# Patient Record
(demographics unavailable — no encounter records)

---

## 2025-04-29 NOTE — HISTORY OF PRESENT ILLNESS
[FreeTextEntry1] : 53 f/u CAD   former smoker, w/ PMH of DM, B/L LE cellulitis 2-3 yrs ago who was referred to [facility] ED 3/9/24 from  with CC of CP X 1 day. Pt. reports around 8 pm. he experienced burning chest pain lasting 20-30 min which resolved after drinking a glass of water. After 10 min, he reports having sharp b/l chest pain, nonradiating, rates it as 8/10 lasting for 1-2 hrs prompting to go to . He denies any SOB. diaphoresis prior CP, dizziness, N/V, LE edema, PND/orthopnea and syncopeIn ED, /76 HR 88 RR 17 T 36.7 02 100 RA, Labs revealed HsTropT 65, CXR: normal. EKG: NSR @ 76, no ST -T changes noted. In ED, no treatment. Pt. received  mg PO x 1. Pt. is now admitted to cardiac tele for management of CP.  Patient R/I NSTEMI peak Troponin 256 and downtrended. CKCKMB negative. Patient  s/p cardiac cath 3/11/24 ODILON dRCA (95%).   Since d/c no recurence of presenting CP, no symptoms at all  Last seen 3/24

## 2025-04-29 NOTE — HISTORY OF PRESENT ILLNESS
[FreeTextEntry1] : 53 f/u CAD   former smoker, w/ PMH of DM, B/L LE cellulitis 2-3 yrs ago who was referred to [facility] ED 3/9/24 from  with CC of CP X 1 day. Pt. reports around 8 pm. he experienced burning chest pain lasting 20-30 min which resolved after drinking a glass of water. After 10 min, he reports having sharp b/l chest pain, nonradiating, rates it as 8/10 lasting for 1-2 hrs prompting to go to . He denies any SOB. diaphoresis prior CP, dizziness, N/V, LE edema, PND/orthopnea and syncopeIn ED, /76 HR 88 RR 17 T 36.7 02 100 RA, Labs revealed HsTropT 65, CXR: normal. EKG: NSR @ 76, no ST -T changes noted. In ED, no treatment. Pt. received  mg PO x 1. Pt. is now admitted to cardiac tele for management of CP.  Patient R/I NSTEMI peak Troponin 256 and downtrended. CKCKMB negative. Patient  s/p cardiac cath 3/11/24 ODILON dRCA (95%).   Since d/c no recurence of presenting CP, no symptoms at all  Last seen 3/24

## 2025-04-29 NOTE — PHYSICAL EXAM
Dr. Tillman contacted by phone regarding scheduled guaifenesen. Order received to change to prn.   [Well Nourished] : well nourished [No Acute Distress] : no acute distress [Obese] : obese [Normal Conjunctiva] : normal conjunctiva [Normal Venous Pressure] : normal venous pressure [No Carotid Bruit] : no carotid bruit [Normal S1, S2] : normal S1, S2 [No Murmur] : no murmur [No Rub] : no rub [No Gallop] : no gallop [Clear Lung Fields] : clear lung fields [Good Air Entry] : good air entry [No Respiratory Distress] : no respiratory distress  [Soft] : abdomen soft [Non Tender] : non-tender [No Masses/organomegaly] : no masses/organomegaly [Normal Bowel Sounds] : normal bowel sounds [Normal Gait] : normal gait [No Edema] : no edema [No Cyanosis] : no cyanosis [No Clubbing] : no clubbing [No Varicosities] : no varicosities [No Rash] : no rash [No Skin Lesions] : no skin lesions [Moves all extremities] : moves all extremities [No Focal Deficits] : no focal deficits [Normal Speech] : normal speech [Alert and Oriented] : alert and oriented [Normal memory] : normal memory [de-identified] : R  foream ecchymopsis, no sweeling, warmth or induarion, R radial access site clean and dry. no bruit

## 2025-04-29 NOTE — ASSESSMENT
[FreeTextEntry1] : EKG NSR IWMI  A/P 1. CAD 2. NSTEMI 3. ODILON RCA angina free  continue risk modification   4. HTN BP at goal continue same meds  5. DM, type 2 Hg A1 C as above  f/u w his endocrinoligist/weight loss team at [facility] now on mounjaro and jardiance  lost 13 pounds  6. Hyperlipidemia, target LDL < 70 recheck lipids also NMR LioProfile  7. suspect ANIYAH. In view of symptoms as listed in HPI, and associated medical and cardiac conditions as noted, HST is indicated to assess for ANIYAH.

## 2025-04-29 NOTE — PHYSICAL EXAM
[Well Nourished] : well nourished [No Acute Distress] : no acute distress [Obese] : obese [Normal Conjunctiva] : normal conjunctiva [Normal Venous Pressure] : normal venous pressure [No Carotid Bruit] : no carotid bruit [Normal S1, S2] : normal S1, S2 [No Murmur] : no murmur [No Rub] : no rub [No Gallop] : no gallop [Clear Lung Fields] : clear lung fields [Good Air Entry] : good air entry [No Respiratory Distress] : no respiratory distress  [Soft] : abdomen soft [Non Tender] : non-tender [No Masses/organomegaly] : no masses/organomegaly [Normal Bowel Sounds] : normal bowel sounds [Normal Gait] : normal gait [No Edema] : no edema [No Cyanosis] : no cyanosis [No Clubbing] : no clubbing [No Varicosities] : no varicosities [No Rash] : no rash [No Skin Lesions] : no skin lesions [Moves all extremities] : moves all extremities [No Focal Deficits] : no focal deficits [Normal Speech] : normal speech [Alert and Oriented] : alert and oriented [Normal memory] : normal memory [de-identified] : R  foream ecchymopsis, no sweeling, warmth or induarion, R radial access site clean and dry. no bruit

## 2025-05-08 NOTE — ASSESSMENT
[FreeTextEntry1] : 53M with CAD s/p PCI and stent here for ED, minimal effective erectile function 5/10 on scale. Notes also low libido and energy.   ED Pills to begin, elected for viagra  PCa screen PSA  Low Libido TT E2 LH CBC BMP UA/UCx  F/up 3mo

## 2025-05-08 NOTE — HISTORY OF PRESENT ILLNESS
[FreeTextEntry1] : Patient 53M with CAD s/p ODILON in 2024. Referred by cardiologist. DM2. C/o ED and worsened urinary stream. ED has been for a few years, not able to achieve full erections. 5/10, not usually able to penetrate. No Am erections anymore. No difference in AM vs PM. Notes lethargic, loss of libido and lower energy levels.   PMHx: as above PSHx: ODILON PCI Ax: PCN (rash) Medications: Mounjaro, Jardiance, Atorvastatin, Metoprolol FHx: No stones, Father PCa (passed from PCa), No RCC, No Bladder cancer SHx: Former smoker, quit 10 yrs ago, social EtOH, Former restaurant owner

## 2025-06-19 NOTE — HISTORY OF PRESENT ILLNESS
[de-identified] : Pt is a 54 y/o M with pmhx of morbid obesity BMI 37, DM2 currently on mounjaro 15mg, ANIYAH, HLD, hx of MI x stent placement 1 yr ago no longer on a/c, large ventral hernia, who presents today feeling well here for initial weight loss assessment. Pt reports a longstanding hx of morbid obesity and states his highest recorded weight was about 360lbs, current weight 262lbs. Pt expresses some hesitation proceeding with bariatric surgery and would first like to trial nutritional counseling. Denies any issues with obstipation no obstructive symptoms. At time of evaluation, afebrile, hemodynamically stable, abdomen soft, nontender, non distended, obese, large chronically incarcerated ventral hernia, no rebound or guarding. The patient has been overweight for more than 5 years and has tried multiple diet regimens which resulted in weight loss that was regained in the months following the diet. The patient has also tried fad diets to lose weight without success.

## 2025-06-19 NOTE — ASSESSMENT
[FreeTextEntry1] : Pt is a 52 y/o M with pmhx of morbid obesity BMI 37, DM2 currently on mounjaro 15mg, ANIYAH, HLD, hx of MI x stent placement 1 yr ago no longer on a/c, large ventral hernia, who presents today feeling well here for initial weight loss assessment. Pt reports a longstanding hx of morbid obesity and states his highest recorded weight was about 360lbs, current weight 262lbs. Pt expresses some hesitation proceeding with bariatric surgery and would first like to trial nutritional counseling. Denies any issues with obstipation no obstructive symptoms. At time of evaluation, afebrile, hemodynamically stable, abdomen soft, nontender, nondsitended, obese, large chronically incarcerated ventral hernia, no rebound or guarding.  Discussed recommendation for weight loss prior to any consideration of ventral hernia repair Nutrition counseling, on Mounjaro Discussed role for bariatric surgery Will review and return for evaluation Will plan to obtain UGI and medical assessments in interim  I, Dr. Bravo Gordon, spent 50 minutes with the patient >50% counseling/coordination of care including, reviewing the patient's history, performing an examination, reviewing relevant labs and radiographic imaging, reviewing PCP and consultant notes, discussion of medical and surgical management of the diagnosis as well as associated risks and benefits, and completing documentation.

## 2025-06-19 NOTE — ASSESSMENT
[FreeTextEntry1] : Pt is a 54 y/o M with pmhx of morbid obesity BMI 37, DM2 currently on mounjaro 15mg, ANIYAH, HLD, hx of MI x stent placement 1 yr ago no longer on a/c, large ventral hernia, who presents today feeling well here for initial weight loss assessment. Pt reports a longstanding hx of morbid obesity and states his highest recorded weight was about 360lbs, current weight 262lbs. Pt expresses some hesitation proceeding with bariatric surgery and would first like to trial nutritional counseling. Denies any issues with obstipation no obstructive symptoms. At time of evaluation, afebrile, hemodynamically stable, abdomen soft, nontender, nondsitended, obese, large chronically incarcerated ventral hernia, no rebound or guarding.  Discussed recommendation for weight loss prior to any consideration of ventral hernia repair Nutrition counseling, on Mounjaro Discussed role for bariatric surgery Will review and return for evaluation Will plan to obtain UGI and medical assessments in interim  I, Dr. Bravo Gordon, spent 50 minutes with the patient >50% counseling/coordination of care including, reviewing the patient's history, performing an examination, reviewing relevant labs and radiographic imaging, reviewing PCP and consultant notes, discussion of medical and surgical management of the diagnosis as well as associated risks and benefits, and completing documentation.

## 2025-06-19 NOTE — HISTORY OF PRESENT ILLNESS
[de-identified] : Pt is a 54 y/o M with pmhx of morbid obesity BMI 37, DM2 currently on mounjaro 15mg, ANIYAH, HLD, hx of MI x stent placement 1 yr ago no longer on a/c, large ventral hernia, who presents today feeling well here for initial weight loss assessment. Pt reports a longstanding hx of morbid obesity and states his highest recorded weight was about 360lbs, current weight 262lbs. Pt expresses some hesitation proceeding with bariatric surgery and would first like to trial nutritional counseling. Denies any issues with obstipation no obstructive symptoms. At time of evaluation, afebrile, hemodynamically stable, abdomen soft, nontender, non distended, obese, large chronically incarcerated ventral hernia, no rebound or guarding. The patient has been overweight for more than 5 years and has tried multiple diet regimens which resulted in weight loss that was regained in the months following the diet. The patient has also tried fad diets to lose weight without success.

## 2025-07-02 NOTE — HISTORY OF PRESENT ILLNESS
[FreeTextEntry1] : PCP: JONATAN GAINES   54 year M with h/o T2DM, obesity, HLD, ANIYAH (w Bipap)   INITIAL VISIT 07/02/2025: -pt is now transitioning care to [facility]. -was following with Endocrinologist at [facility],  last seen 5 months.  -pt recently saw [facility] Bariatric team (6/18/2025): as per note, highest weight 360 lb.  States lost 90 lb without any meds and states preDM resolved.  Then regained weight (variable weight with prior trials of other GLP1 and keto diet).  Thinks pre-Mounjaro weight was ~310 lb, current weight 269 lb.  Pt expressed hesitation for bariatric surgery currently. Saw bariatric team's RD: has not made significant changes since.    -adherent with Mounjaro 15 mg daily; sometimes diarrhea but tolerable; no n/v. -overall adherent with jardiance (uncertain dose); no  issues. -decreased appetites. 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  -DIET:  rare juice intake, drinks diet sodas.   breakfast: skips.  If eats, unsweetened yogurt; coffee w artificial sweetener/milk/half-half snack: chickpea chips (former pretzels), nuts (unseasoned), sugarfree jellybeans/licorice. (snack size).  lunch: sandwich (sliced bread) with pastrami or fish/crackers (10 crackers) or bread (2 slices) or pizza or sushi snacks:  dinner: potato with sometimes meat or vegetables. snack: candies  PHYSICAL ACTIVITY: -sedentary mostly. -sometimes walks   Current relevant MEDS/DEVICES: Mounjaro 15 mg, increased x 2-3 months ago. Jardiance once daily.  Previously was on metformin but had diarrhea.  prior h/o other GLP1.  DM HISTORY: -diagnosed ~early 40s; on routine test. -prior hospitalizations: none   -FMH:  maternal GF and paternal uncle (DM).  -SOCIAL HX: -former cig smoker (1 ppd x 10 years, quit at ~39 yo). -social EtOH -no other recreational drugs -works in the [employer] world.  COMPLICATIONS:  -CAD (stent, 3/2024) -?foot cellulitis   GLUCOSE monitoring: none.  -Dexcom G7 (last used x months ago).   HCM: Retinal exam- earlier in 2025; denies any retinopathy. Neuropathy- no lesions; last saw podiatry in 2024.

## 2025-07-02 NOTE — PHYSICAL EXAM
[Alert] : alert [Obese] : obese [No Acute Distress] : no acute distress [No Respiratory Distress] : no respiratory distress [Clear to Auscultation] : lungs were clear to auscultation bilaterally [Normal Rate] : heart rate was normal [Regular Rhythm] : with a regular rhythm [No Stigmata of Cushings Syndrome] : no stigmata of Cushings Syndrome [Oriented x3] : oriented to person, place, and time [de-identified] : no visible goiter

## 2025-07-02 NOTE — ASSESSMENT
[FreeTextEntry1] : 54 year M with h/o  h/o T2DM (c/b CAD), obesity, HLD, ANIYAH (w Bipap) for T2DM.   #) T2DM: -POC HbA1c 6.9% <-- 8.1%.  POCG 185 (1 hr post-prandial). -obesity, with ~40 lb weight loss since starting Mounjaro in 2024.    -discussed DM, clinical course, and potential complications. Discussed HbA1c and glucose targets. --> c/w Mounjaro 15 mg once weekly and Jardiance (pt to call back and confirm dose). --> to f/u with Bariatric team and RD at Bariatric team.   -discussed in-depth dietary changes including incorporating some vegetables and decreasing carb portions of meals. Advised lower carb snacks of sweets/candies, chips.  -discussed increasing exercising. Pt plans to start going to gym at least 3x/week and/or walking.  -discussed importance of regular glucose monitoring, hypoglycemic symptoms and its treatment. Advised to check glucoses: once daily. Pt has Dexcom, advised to resume or check FS ~once daily.  #) Hyperlipidemia: -on atorvastatin 80 mg daily.   #) BMP/Urine MA: -check urine MA today    #) Ophthalmology screening: UTD, reports no retinopathy. #) Prior h/o foot cellulitis; pt follows w Podiatry: advised to schedule f/u.    RTC: 3 months    I have spent 60 minutes of time on the encounter. This time included review of previous records, lab. and radiological data, discussing findings, differential diagnosis, further testing and evaluation, therapeutic options, renewing medications, completing the record.